# Patient Record
Sex: MALE | Race: WHITE | NOT HISPANIC OR LATINO | URBAN - METROPOLITAN AREA
[De-identification: names, ages, dates, MRNs, and addresses within clinical notes are randomized per-mention and may not be internally consistent; named-entity substitution may affect disease eponyms.]

---

## 2017-01-17 ENCOUNTER — ALLSCRIPTS OFFICE VISIT (OUTPATIENT)
Dept: OTHER | Facility: OTHER | Age: 27
End: 2017-01-17

## 2017-01-17 DIAGNOSIS — J02.9 ACUTE PHARYNGITIS: ICD-10-CM

## 2017-01-18 ENCOUNTER — APPOINTMENT (OUTPATIENT)
Dept: LAB | Age: 27
End: 2017-01-18
Payer: COMMERCIAL

## 2017-01-18 ENCOUNTER — TRANSCRIBE ORDERS (OUTPATIENT)
Dept: ADMINISTRATIVE | Age: 27
End: 2017-01-18

## 2017-01-18 DIAGNOSIS — J02.9 ACUTE PHARYNGITIS: ICD-10-CM

## 2017-01-18 LAB
BASOPHILS # BLD AUTO: 0.03 THOUSANDS/ΜL (ref 0–0.1)
BASOPHILS NFR BLD AUTO: 0 % (ref 0–1)
EOSINOPHIL # BLD AUTO: 0.04 THOUSAND/ΜL (ref 0–0.61)
EOSINOPHIL NFR BLD AUTO: 0 % (ref 0–6)
ERYTHROCYTE [DISTWIDTH] IN BLOOD BY AUTOMATED COUNT: 14.1 % (ref 11.6–15.1)
HCT VFR BLD AUTO: 42.6 % (ref 36.5–49.3)
HGB BLD-MCNC: 14.6 G/DL (ref 12–17)
LYMPHOCYTES # BLD AUTO: 1.58 THOUSANDS/ΜL (ref 0.6–4.47)
LYMPHOCYTES NFR BLD AUTO: 12 % (ref 14–44)
MCH RBC QN AUTO: 31.7 PG (ref 26.8–34.3)
MCHC RBC AUTO-ENTMCNC: 34.3 G/DL (ref 31.4–37.4)
MCV RBC AUTO: 93 FL (ref 82–98)
MONOCYTES # BLD AUTO: 1.16 THOUSAND/ΜL (ref 0.17–1.22)
MONOCYTES NFR BLD AUTO: 9 % (ref 4–12)
NEUTROPHILS # BLD AUTO: 10.16 THOUSANDS/ΜL (ref 1.85–7.62)
NEUTS SEG NFR BLD AUTO: 79 % (ref 43–75)
NRBC BLD AUTO-RTO: 0 /100 WBCS
PLATELET # BLD AUTO: 240 THOUSANDS/UL (ref 149–390)
PMV BLD AUTO: 11.3 FL (ref 8.9–12.7)
RBC # BLD AUTO: 4.6 MILLION/UL (ref 3.88–5.62)
WBC # BLD AUTO: 13.01 THOUSAND/UL (ref 4.31–10.16)

## 2017-01-18 PROCEDURE — 36415 COLL VENOUS BLD VENIPUNCTURE: CPT

## 2017-01-18 PROCEDURE — 85025 COMPLETE CBC W/AUTO DIFF WBC: CPT

## 2017-01-20 ENCOUNTER — ALLSCRIPTS OFFICE VISIT (OUTPATIENT)
Dept: OTHER | Facility: OTHER | Age: 27
End: 2017-01-20

## 2017-04-07 ENCOUNTER — ALLSCRIPTS OFFICE VISIT (OUTPATIENT)
Dept: OTHER | Facility: OTHER | Age: 27
End: 2017-04-07

## 2017-04-10 DIAGNOSIS — Z00.00 ENCOUNTER FOR GENERAL ADULT MEDICAL EXAMINATION WITHOUT ABNORMAL FINDINGS: ICD-10-CM

## 2017-04-12 ENCOUNTER — APPOINTMENT (OUTPATIENT)
Dept: LAB | Age: 27
End: 2017-04-12
Payer: COMMERCIAL

## 2017-04-12 ENCOUNTER — TRANSCRIBE ORDERS (OUTPATIENT)
Dept: ADMINISTRATIVE | Age: 27
End: 2017-04-12

## 2017-04-12 DIAGNOSIS — Z00.00 ENCOUNTER FOR GENERAL ADULT MEDICAL EXAMINATION WITHOUT ABNORMAL FINDINGS: ICD-10-CM

## 2017-04-12 PROCEDURE — 86632 CHLAMYDIA IGM ANTIBODY: CPT

## 2017-04-12 PROCEDURE — 87389 HIV-1 AG W/HIV-1&-2 AB AG IA: CPT

## 2017-04-12 PROCEDURE — 80074 ACUTE HEPATITIS PANEL: CPT

## 2017-04-12 PROCEDURE — 86592 SYPHILIS TEST NON-TREP QUAL: CPT

## 2017-04-12 PROCEDURE — 36415 COLL VENOUS BLD VENIPUNCTURE: CPT

## 2017-04-12 PROCEDURE — 86631 CHLAMYDIA ANTIBODY: CPT

## 2017-04-13 LAB
HAV IGM SER QL: NORMAL
HBV CORE IGM SER QL: NORMAL
HBV SURFACE AG SER QL: NORMAL
HCV AB SER QL: NORMAL
RPR SER QL: NORMAL

## 2017-04-14 LAB — HIV 1+2 AB+HIV1 P24 AG SERPL QL IA: NORMAL

## 2017-04-17 LAB
C PNEUM IGA TITR SER IF: NORMAL {TITER}
C PNEUM IGA+IGG+IGM SER-IMP: NORMAL
C PNEUM IGG TITR SER IF: NORMAL {TITER}
C PNEUM IGM TITR SER IF: NORMAL {TITER}

## 2017-08-09 ENCOUNTER — ALLSCRIPTS OFFICE VISIT (OUTPATIENT)
Dept: OTHER | Facility: OTHER | Age: 27
End: 2017-08-09

## 2018-01-13 VITALS
WEIGHT: 198.5 LBS | DIASTOLIC BLOOD PRESSURE: 78 MMHG | TEMPERATURE: 97.7 F | BODY MASS INDEX: 26.31 KG/M2 | OXYGEN SATURATION: 99 % | SYSTOLIC BLOOD PRESSURE: 122 MMHG | HEART RATE: 62 BPM | HEIGHT: 73 IN

## 2018-01-14 VITALS
HEART RATE: 64 BPM | SYSTOLIC BLOOD PRESSURE: 110 MMHG | WEIGHT: 199.8 LBS | DIASTOLIC BLOOD PRESSURE: 82 MMHG | BODY MASS INDEX: 25.64 KG/M2 | TEMPERATURE: 97.6 F | HEIGHT: 74 IN | OXYGEN SATURATION: 99 % | RESPIRATION RATE: 18 BRPM

## 2018-01-14 VITALS
TEMPERATURE: 97.7 F | SYSTOLIC BLOOD PRESSURE: 120 MMHG | WEIGHT: 201.25 LBS | OXYGEN SATURATION: 98 % | HEIGHT: 73 IN | BODY MASS INDEX: 26.67 KG/M2 | HEART RATE: 67 BPM | DIASTOLIC BLOOD PRESSURE: 74 MMHG

## 2018-01-14 VITALS
SYSTOLIC BLOOD PRESSURE: 118 MMHG | BODY MASS INDEX: 26.14 KG/M2 | HEART RATE: 73 BPM | TEMPERATURE: 98 F | OXYGEN SATURATION: 98 % | HEIGHT: 73 IN | DIASTOLIC BLOOD PRESSURE: 80 MMHG | WEIGHT: 197.25 LBS

## 2022-12-14 ENCOUNTER — OFFICE VISIT (OUTPATIENT)
Dept: INTERNAL MEDICINE CLINIC | Age: 32
End: 2022-12-14

## 2022-12-14 VITALS
DIASTOLIC BLOOD PRESSURE: 74 MMHG | OXYGEN SATURATION: 98 % | WEIGHT: 208 LBS | BODY MASS INDEX: 26.69 KG/M2 | HEART RATE: 83 BPM | SYSTOLIC BLOOD PRESSURE: 108 MMHG | HEIGHT: 74 IN | TEMPERATURE: 98.3 F

## 2022-12-14 DIAGNOSIS — R21 FACIAL RASH: ICD-10-CM

## 2022-12-14 DIAGNOSIS — R53.83 OTHER FATIGUE: ICD-10-CM

## 2022-12-14 DIAGNOSIS — R06.83 SNORING: ICD-10-CM

## 2022-12-14 DIAGNOSIS — Z13.228 SCREENING FOR METABOLIC DISORDER: ICD-10-CM

## 2022-12-14 DIAGNOSIS — J34.2 DEVIATED SEPTUM: Primary | ICD-10-CM

## 2022-12-14 DIAGNOSIS — D17.1 LIPOMA OF BACK: ICD-10-CM

## 2022-12-14 RX ORDER — DIPHENOXYLATE HYDROCHLORIDE AND ATROPINE SULFATE 2.5; .025 MG/1; MG/1
1 TABLET ORAL DAILY
COMMUNITY

## 2022-12-14 NOTE — PROGRESS NOTES
Assessment/Plan:         Diagnoses and all orders for this visit:    Deviated septum  Comments:  recommending ENT eval, likely need septoplasty due to chronic congestion and snoring  Orders:  -     CBC and differential; Future  -     Comprehensive metabolic panel; Future  -     Lipid panel; Future  -     Ambulatory Referral to Otolaryngology; Future    Facial rash  Comments:  intermittent, likely rosacea  check labs  refer to derm   Orders:  -     Ambulatory Referral to Dermatology; Future  -     CBC and differential; Future  -     Comprehensive metabolic panel; Future  -     Lipid panel; Future  -     AIMEE Screen w/ Reflex to Titer/Pattern; Future    Screening for metabolic disorder  -     CBC and differential; Future  -     Comprehensive metabolic panel; Future  -     Lipid panel; Future    Snoring  -     CBC and differential; Future  -     Comprehensive metabolic panel; Future  -     Lipid panel; Future  -     Ambulatory Referral to Otolaryngology; Future    Lipoma of back  Comments:  not painful, will watch  consider excision if painful  Orders:  -     CBC and differential; Future  -     Comprehensive metabolic panel; Future  -     Lipid panel; Future    Other fatigue  Comments:  check labs  Orders:  -     Testosterone, free, total; Future  -     CBC and differential; Future  -     AIMEE Screen w/ Reflex to Titer/Pattern; Future    Other orders  -     multivitamin (THERAGRAN) TABS; Take 1 tablet by mouth daily  -     Creatine POWD; Take by mouth in the morning        Declines flu vaccine    Subjective:      Patient ID: Rojas Randolph is a 29 y o  male      30 y/o male presents for visit to establish care   Pt has concerns today    Pt states he tends to breathe through his mouth bc he feels nasally congested   Pt states season changes don't seem to affect this   Pt has no known hx of nasal fracture but states he boxed recreationally in past and was told he prob injured his nose doing this  Reports this issue since 2017    Pt reports pressure in nose with bending forward  Denies recent sinus infections, denies nose bleeds    Pt does reports hx of snoring, sometimes wakes him from sleep       Pt reports "lump" on shoulder blade - not painful  Pt reports recurrent injury in neck and shoulder  Pt states he went to PT for low back in past and states there is an abnl on L shoulder blade  Not painful but concerned with lump  Pt goes to gym and works out but this doesn't affect it     C/o red blotchy skin - intermittent   Tends to occur with cold weather   Uses sensitive skin cleanser and moisturizer  Only affects cheeks and nose  Appears red and raised   Not present now       The following portions of the patient's history were reviewed and updated as appropriate: allergies, current medications, past family history, past medical history, past social history, past surgical history and problem list     Review of Systems   Constitutional: Negative for activity change, appetite change, chills, diaphoresis, fatigue and fever  HENT: Positive for congestion (constant nasal congestion ) and sinus pressure  Negative for ear pain, facial swelling, nosebleeds, postnasal drip, sore throat and trouble swallowing  Eyes: Positive for visual disturbance (slight worsening, wears contacts)  Negative for pain  Respiratory: Negative for cough, chest tightness, shortness of breath and wheezing  Cardiovascular: Negative for chest pain, palpitations and leg swelling  Gastrointestinal: Negative for abdominal pain, constipation, diarrhea and nausea  Musculoskeletal: Negative for arthralgias and gait problem  Skin: Negative for rash and wound  Allergic/Immunologic: Negative for environmental allergies  Neurological: Negative for dizziness, light-headedness and headaches  Hematological: Does not bruise/bleed easily  Psychiatric/Behavioral: Negative for sleep disturbance  The patient is not nervous/anxious  History reviewed   No pertinent past medical history  Current Outpatient Medications:   •  Creatine POWD, Take by mouth in the morning, Disp: , Rfl:   •  multivitamin (THERAGRAN) TABS, Take 1 tablet by mouth daily, Disp: , Rfl:     Allergies   Allergen Reactions   • Amoxicillin-Pot Clavulanate Throat Swelling   • Azithromycin Hives and Throat Swelling       Social History   History reviewed  No pertinent surgical history  History reviewed  No pertinent family history  Objective:  /74 (BP Location: Left arm, Patient Position: Sitting, Cuff Size: Large)   Pulse 83   Temp 98 3 °F (36 8 °C) (Temporal)   Ht 6' 1 5" (1 867 m)   Wt 94 3 kg (208 lb)   SpO2 98% Comment: room air  BMI 27 07 kg/m²        Physical Exam  Vitals reviewed  Constitutional:       General: He is not in acute distress  HENT:      Head: Normocephalic and atraumatic  Right Ear: Tympanic membrane, ear canal and external ear normal       Left Ear: Tympanic membrane, ear canal and external ear normal       Nose: No congestion  Comments: Septum deviated to R   Eyes:      General:         Right eye: No discharge  Left eye: No discharge  Extraocular Movements: Extraocular movements intact  Conjunctiva/sclera: Conjunctivae normal       Pupils: Pupils are equal, round, and reactive to light  Cardiovascular:      Rate and Rhythm: Normal rate and regular rhythm  Pulmonary:      Effort: Pulmonary effort is normal  No respiratory distress  Breath sounds: Normal breath sounds  No wheezing, rhonchi or rales  Abdominal:      General: Bowel sounds are normal  There is no distension  Musculoskeletal:         General: Deformity (medial to L scap: mobile, soft nodule subcut tissue, nontender - approx 2 inch diamter) present  Normal range of motion  Cervical back: Normal range of motion  Right lower leg: No edema  Left lower leg: No edema  Lymphadenopathy:      Cervical: No cervical adenopathy     Skin: General: Skin is warm  Findings: No erythema or rash  Neurological:      General: No focal deficit present  Mental Status: He is alert and oriented to person, place, and time  Cranial Nerves: No cranial nerve deficit  Motor: No weakness     Psychiatric:         Mood and Affect: Mood normal

## 2022-12-28 ENCOUNTER — APPOINTMENT (OUTPATIENT)
Dept: LAB | Age: 32
End: 2022-12-28

## 2022-12-28 DIAGNOSIS — R53.83 OTHER FATIGUE: ICD-10-CM

## 2022-12-28 DIAGNOSIS — R06.83 SNORING: ICD-10-CM

## 2022-12-28 DIAGNOSIS — J34.2 DEVIATED SEPTUM: ICD-10-CM

## 2022-12-28 DIAGNOSIS — Z13.228 SCREENING FOR METABOLIC DISORDER: ICD-10-CM

## 2022-12-28 DIAGNOSIS — D17.1 LIPOMA OF BACK: ICD-10-CM

## 2022-12-28 DIAGNOSIS — R21 FACIAL RASH: ICD-10-CM

## 2022-12-28 LAB
ALBUMIN SERPL BCP-MCNC: 4.3 G/DL (ref 3.5–5)
ALP SERPL-CCNC: 39 U/L (ref 46–116)
ALT SERPL W P-5'-P-CCNC: 36 U/L (ref 12–78)
ANA SER QL IA: NEGATIVE
ANION GAP SERPL CALCULATED.3IONS-SCNC: 5 MMOL/L (ref 4–13)
AST SERPL W P-5'-P-CCNC: 25 U/L (ref 5–45)
BASOPHILS # BLD AUTO: 0.04 THOUSANDS/ÂΜL (ref 0–0.1)
BASOPHILS NFR BLD AUTO: 0 % (ref 0–1)
BILIRUB SERPL-MCNC: 0.79 MG/DL (ref 0.2–1)
BUN SERPL-MCNC: 20 MG/DL (ref 5–25)
CALCIUM SERPL-MCNC: 9.7 MG/DL (ref 8.3–10.1)
CHLORIDE SERPL-SCNC: 101 MMOL/L (ref 96–108)
CHOLEST SERPL-MCNC: 189 MG/DL
CO2 SERPL-SCNC: 28 MMOL/L (ref 21–32)
CREAT SERPL-MCNC: 1.1 MG/DL (ref 0.6–1.3)
EOSINOPHIL # BLD AUTO: 0.06 THOUSAND/ÂΜL (ref 0–0.61)
EOSINOPHIL NFR BLD AUTO: 1 % (ref 0–6)
ERYTHROCYTE [DISTWIDTH] IN BLOOD BY AUTOMATED COUNT: 13.9 % (ref 11.6–15.1)
GFR SERPL CREATININE-BSD FRML MDRD: 89 ML/MIN/1.73SQ M
GLUCOSE P FAST SERPL-MCNC: 107 MG/DL (ref 65–99)
HCT VFR BLD AUTO: 42.8 % (ref 36.5–49.3)
HDLC SERPL-MCNC: 82 MG/DL
HGB BLD-MCNC: 14 G/DL (ref 12–17)
IMM GRANULOCYTES # BLD AUTO: 0.01 THOUSAND/UL (ref 0–0.2)
IMM GRANULOCYTES NFR BLD AUTO: 0 % (ref 0–2)
LDLC SERPL CALC-MCNC: 98 MG/DL (ref 0–100)
LYMPHOCYTES # BLD AUTO: 1.35 THOUSANDS/ÂΜL (ref 0.6–4.47)
LYMPHOCYTES NFR BLD AUTO: 15 % (ref 14–44)
MCH RBC QN AUTO: 31 PG (ref 26.8–34.3)
MCHC RBC AUTO-ENTMCNC: 32.7 G/DL (ref 31.4–37.4)
MCV RBC AUTO: 95 FL (ref 82–98)
MONOCYTES # BLD AUTO: 0.74 THOUSAND/ÂΜL (ref 0.17–1.22)
MONOCYTES NFR BLD AUTO: 8 % (ref 4–12)
NEUTROPHILS # BLD AUTO: 7.07 THOUSANDS/ÂΜL (ref 1.85–7.62)
NEUTS SEG NFR BLD AUTO: 76 % (ref 43–75)
NONHDLC SERPL-MCNC: 107 MG/DL
NRBC BLD AUTO-RTO: 0 /100 WBCS
PLATELET # BLD AUTO: 243 THOUSANDS/UL (ref 149–390)
PMV BLD AUTO: 10.7 FL (ref 8.9–12.7)
POTASSIUM SERPL-SCNC: 4.4 MMOL/L (ref 3.5–5.3)
PROT SERPL-MCNC: 7.7 G/DL (ref 6.4–8.4)
RBC # BLD AUTO: 4.52 MILLION/UL (ref 3.88–5.62)
SODIUM SERPL-SCNC: 134 MMOL/L (ref 135–147)
TRIGL SERPL-MCNC: 47 MG/DL
WBC # BLD AUTO: 9.27 THOUSAND/UL (ref 4.31–10.16)

## 2022-12-29 DIAGNOSIS — R73.01 IMPAIRED FASTING GLUCOSE: Primary | ICD-10-CM

## 2022-12-29 LAB
TESTOST FREE SERPL-MCNC: 15.1 PG/ML (ref 8.7–25.1)
TESTOST SERPL-MCNC: 573 NG/DL (ref 264–916)

## 2023-04-11 PROBLEM — R06.83 SNORING: Status: ACTIVE | Noted: 2023-04-11

## 2023-04-11 PROBLEM — J34.2 DEVIATED SEPTUM: Status: ACTIVE | Noted: 2023-04-11

## 2024-05-17 ENCOUNTER — NURSE TRIAGE (OUTPATIENT)
Age: 34
End: 2024-05-17

## 2024-05-17 ENCOUNTER — OFFICE VISIT (OUTPATIENT)
Dept: URGENT CARE | Age: 34
End: 2024-05-17
Payer: COMMERCIAL

## 2024-05-17 VITALS
RESPIRATION RATE: 20 BRPM | HEART RATE: 63 BPM | DIASTOLIC BLOOD PRESSURE: 89 MMHG | TEMPERATURE: 98.4 F | OXYGEN SATURATION: 98 % | SYSTOLIC BLOOD PRESSURE: 122 MMHG

## 2024-05-17 DIAGNOSIS — J06.9 VIRAL URI WITH COUGH: Primary | ICD-10-CM

## 2024-05-17 PROCEDURE — 99214 OFFICE O/P EST MOD 30 MIN: CPT

## 2024-05-17 NOTE — PATIENT INSTRUCTIONS
Your symptoms are most likely related to a viral infection.  Viral infections can sometimes last for 10-14 days and symptoms are usually most intense at days 3 through 5. Your symptoms should begin to improve after 1 week. You may use over-the-counter cough and cold medication such as Mucinex for cough and chest congestion.  We recommend trialing warm salt water gargles, warm tea with honey, Chloraseptic spray or Cepacol cough drops as needed for sore throat.  It is also recommended to ensure adequate fluid intake. Flonase may help with congestion and post nasal drip. You may alternate Motrin and Tylenol as needed for fever and pain.  If your symptoms persist, please follow-up with your primary care provider for further evaluation.  If your symptoms worsen, or you develop new, worsening or concerning symptoms, please go to the ER for further workup and evaluation.    Please trial  OTC cough and cold medication.   Drink plenty of fluids.

## 2024-05-17 NOTE — TELEPHONE ENCOUNTER
"Returned call to patient. Patient having cold or allergy symptoms. Advised to try OTC allergy medication and decongestant. Patien verbalized understanding. Advised if symptoms worsen over weekend he can go to urgent care to be seen. Patient verbalized understanding.           Reason for Disposition   Colds with no complications    Answer Assessment - Initial Assessment Questions  1. ONSET: \"When did the nasal discharge start?\"       Started on Monday   2. AMOUNT: \"How much discharge is there?\"       denies  3. COUGH: \"Do you have a cough?\" If yes, ask: \"Describe the color of your sputum\" (clear, white, yellow, green)      mild  4. RESPIRATORY DISTRESS: \"Describe your breathing.\"       denies  5. FEVER: \"Do you have a fever?\" If Yes, ask: \"What is your temperature, how was it measured, and when did it start?\"      denies  6. SEVERITY: \"Overall, how bad are you feeling right now?\" (e.g., doesn't interfere with normal activities, staying home from school/work, staying in bed)       Mild to moderate  7. OTHER SYMPTOMS: \"Do you have any other symptoms?\" (e.g., sore throat, earache, wheezing, vomiting)      Nasal congestion, cough   8. PREGNANCY: \"Is there any chance you are pregnant?\" \"When was your last menstrual period?\"      N.a    Protocols used: Common Cold-ADULT-[place]    "

## 2024-05-17 NOTE — PROGRESS NOTES
[facility]        NAME: Jordan Toscano is a 34 y.o. male  : 1990    MRN: [id number]  DATE: May 17, 2024  TIME: 4:31 PM    Assessment and Plan   Viral URI with cough [J06.9]  1. Viral URI with cough          Your symptoms are most likely related to a viral infection.  Viral infections can sometimes last for 10-14 days and symptoms are usually most intense at days 3 through 5. Your symptoms should begin to improve after 1 week. You may use over-the-counter cough and cold medication such as Mucinex for cough and chest congestion.  We recommend trialing warm salt water gargles, warm tea with honey, Chloraseptic spray or Cepacol cough drops as needed for sore throat.  It is also recommended to ensure adequate fluid intake. Flonase may help with congestion and post nasal drip. You may alternate Motrin and Tylenol as needed for fever and pain.  If your symptoms persist, please follow-up with your primary care provider for further evaluation.  If your symptoms worsen, or you develop new, worsening or concerning symptoms, please go to the ER for further workup and evaluation.      Patient Instructions     Please trial  OTC cough and cold medication.   Drink plenty of fluids.   Follow up with PCP in 3-5 days.  Proceed to  ER if symptoms worsen.    If tests are performed, our office will contact you with results only if changes need to made to the care plan discussed with you at the visit. You can review your full results on [facility].    Chief Complaint     Chief Complaint   Patient presents with    Cough    Headache     Cough, headache and sinus congestion since Tuesday.         History of Present Illness       34-year-old male presenting for evaluation of viral symptoms.  Patient states over the past 3 days he has been experiencing congestion, sinus pressure, cough, body aches and headaches.  He states he denies any current treatment for his symptoms.  He denies any fevers, chills, chest pain or  shortness of breath.    Cough  Associated symptoms include headaches and myalgias. Pertinent negatives include no chest pain, chills, fever or shortness of breath.   Headache      Review of Systems   Review of Systems   Constitutional:  Negative for chills and fever.   HENT:  Positive for congestion and sinus pressure.    Respiratory:  Positive for cough. Negative for shortness of breath.    Cardiovascular:  Negative for chest pain.   Gastrointestinal:  Negative for diarrhea, nausea and vomiting.   Musculoskeletal:  Positive for myalgias.   Neurological:  Positive for headaches.   All other systems reviewed and are negative.        Current Medications       Current Outpatient Medications:     Creatine POWD, Take by mouth in the morning (Patient not taking: Reported on 4/11/2023), Disp: , Rfl:     fluticasone (FLONASE) 50 mcg/act nasal spray, 2 sprays into each nostril daily (Patient not taking: Reported on 5/17/2024), Disp: 15.8 mL, Rfl: 5    multivitamin (THERAGRAN) TABS, Take 1 tablet by mouth daily (Patient not taking: Reported on 4/11/2023), Disp: , Rfl:     Current Allergies     Allergies as of 05/17/2024 - Reviewed 05/17/2024   Allergen Reaction Noted    Amoxicillin-pot clavulanate Throat Swelling 05/24/2015    Azithromycin Hives and Throat Swelling 05/24/2015            The following portions of the patient's history were reviewed and updated as appropriate: allergies, current medications, past family history, past medical history, past social history, past surgical history and problem list.     History reviewed. No pertinent past medical history.    History reviewed. No pertinent surgical history.    History reviewed. No pertinent family history.      Medications have been verified.        Objective   /89   Pulse 63   Temp 98.4 °F (36.9 °C) (Temporal)   Resp 20   SpO2 98%        Physical Exam     Physical Exam  Vitals and nursing note reviewed.   Constitutional:       General: He is not in acute  distress.     Appearance: Normal appearance. He is normal weight. He is not ill-appearing, toxic-appearing or diaphoretic.   HENT:      Head: Normocephalic and atraumatic.      Right Ear: Tympanic membrane normal. There is impacted cerumen.      Left Ear: There is impacted cerumen.      Nose: Nose normal. No congestion or rhinorrhea.      Mouth/Throat:      Mouth: Mucous membranes are moist.      Pharynx: Oropharynx is clear. No oropharyngeal exudate or posterior oropharyngeal erythema.   Eyes:      General:         Right eye: No discharge.         Left eye: No discharge.   Cardiovascular:      Rate and Rhythm: Normal rate and regular rhythm.      Pulses: Normal pulses.      Heart sounds: Normal heart sounds. No murmur heard.     No friction rub. No gallop.   Pulmonary:      Effort: Pulmonary effort is normal. No respiratory distress.      Breath sounds: Normal breath sounds. No stridor. No wheezing, rhonchi or rales.   Chest:      Chest wall: No tenderness.   Abdominal:      General: Bowel sounds are normal.      Palpations: Abdomen is soft.      Tenderness: There is no abdominal tenderness.   Skin:     General: Skin is warm and dry.   Neurological:      Mental Status: He is alert.   Psychiatric:         Mood and Affect: Mood normal.         Behavior: Behavior normal.

## 2024-11-22 ENCOUNTER — TELEPHONE (OUTPATIENT)
Dept: INTERNAL MEDICINE CLINIC | Age: 34
End: 2024-11-22